# Patient Record
Sex: FEMALE | Race: OTHER | HISPANIC OR LATINO | URBAN - METROPOLITAN AREA
[De-identification: names, ages, dates, MRNs, and addresses within clinical notes are randomized per-mention and may not be internally consistent; named-entity substitution may affect disease eponyms.]

---

## 2018-07-20 ENCOUNTER — EMERGENCY (EMERGENCY)
Facility: HOSPITAL | Age: 43
LOS: 1 days | Discharge: HOME | End: 2018-07-20
Attending: EMERGENCY MEDICINE | Admitting: EMERGENCY MEDICINE
Payer: SELF-PAY

## 2018-07-20 VITALS
RESPIRATION RATE: 16 BRPM | DIASTOLIC BLOOD PRESSURE: 74 MMHG | OXYGEN SATURATION: 98 % | SYSTOLIC BLOOD PRESSURE: 120 MMHG | HEART RATE: 63 BPM | TEMPERATURE: 99 F

## 2018-07-20 LAB
ALBUMIN SERPL ELPH-MCNC: 4 G/DL — SIGNIFICANT CHANGE UP (ref 3.3–5)
ALP SERPL-CCNC: 67 U/L — SIGNIFICANT CHANGE UP (ref 40–120)
ALT FLD-CCNC: 24 U/L — SIGNIFICANT CHANGE UP (ref 4–33)
APTT BLD: 27.8 SEC — SIGNIFICANT CHANGE UP (ref 27.5–37.4)
AST SERPL-CCNC: 24 U/L — SIGNIFICANT CHANGE UP (ref 4–32)
BASOPHILS # BLD AUTO: 0.07 K/UL — SIGNIFICANT CHANGE UP (ref 0–0.2)
BASOPHILS NFR BLD AUTO: 0.9 % — SIGNIFICANT CHANGE UP (ref 0–2)
BILIRUB SERPL-MCNC: < 0.2 MG/DL — LOW (ref 0.2–1.2)
BUN SERPL-MCNC: 13 MG/DL — SIGNIFICANT CHANGE UP (ref 7–23)
CALCIUM SERPL-MCNC: 8.7 MG/DL — SIGNIFICANT CHANGE UP (ref 8.4–10.5)
CHLORIDE SERPL-SCNC: 102 MMOL/L — SIGNIFICANT CHANGE UP (ref 98–107)
CO2 SERPL-SCNC: 26 MMOL/L — SIGNIFICANT CHANGE UP (ref 22–31)
CREAT SERPL-MCNC: 0.58 MG/DL — SIGNIFICANT CHANGE UP (ref 0.5–1.3)
EOSINOPHIL # BLD AUTO: 0.25 K/UL — SIGNIFICANT CHANGE UP (ref 0–0.5)
EOSINOPHIL NFR BLD AUTO: 3.2 % — SIGNIFICANT CHANGE UP (ref 0–6)
GLUCOSE SERPL-MCNC: 138 MG/DL — HIGH (ref 70–99)
HCG SERPL-ACNC: < 5 MIU/ML — SIGNIFICANT CHANGE UP
HCT VFR BLD CALC: 28.3 % — LOW (ref 34.5–45)
HGB BLD-MCNC: 8.5 G/DL — LOW (ref 11.5–15.5)
IMM GRANULOCYTES # BLD AUTO: 0.02 # — SIGNIFICANT CHANGE UP
IMM GRANULOCYTES NFR BLD AUTO: 0.3 % — SIGNIFICANT CHANGE UP (ref 0–1.5)
INR BLD: 0.99 — SIGNIFICANT CHANGE UP (ref 0.88–1.17)
LYMPHOCYTES # BLD AUTO: 3.04 K/UL — SIGNIFICANT CHANGE UP (ref 1–3.3)
LYMPHOCYTES # BLD AUTO: 38.5 % — SIGNIFICANT CHANGE UP (ref 13–44)
MCHC RBC-ENTMCNC: 21.5 PG — LOW (ref 27–34)
MCHC RBC-ENTMCNC: 30 % — LOW (ref 32–36)
MCV RBC AUTO: 71.6 FL — LOW (ref 80–100)
MONOCYTES # BLD AUTO: 0.68 K/UL — SIGNIFICANT CHANGE UP (ref 0–0.9)
MONOCYTES NFR BLD AUTO: 8.6 % — SIGNIFICANT CHANGE UP (ref 2–14)
NEUTROPHILS # BLD AUTO: 3.84 K/UL — SIGNIFICANT CHANGE UP (ref 1.8–7.4)
NEUTROPHILS NFR BLD AUTO: 48.5 % — SIGNIFICANT CHANGE UP (ref 43–77)
NRBC # FLD: 0.02 — SIGNIFICANT CHANGE UP
PLATELET # BLD AUTO: 371 K/UL — SIGNIFICANT CHANGE UP (ref 150–400)
PMV BLD: 10.7 FL — SIGNIFICANT CHANGE UP (ref 7–13)
POTASSIUM SERPL-MCNC: 3.9 MMOL/L — SIGNIFICANT CHANGE UP (ref 3.5–5.3)
POTASSIUM SERPL-SCNC: 3.9 MMOL/L — SIGNIFICANT CHANGE UP (ref 3.5–5.3)
PROT SERPL-MCNC: 7 G/DL — SIGNIFICANT CHANGE UP (ref 6–8.3)
PROTHROM AB SERPL-ACNC: 11 SEC — SIGNIFICANT CHANGE UP (ref 9.8–13.1)
RBC # BLD: 3.95 M/UL — SIGNIFICANT CHANGE UP (ref 3.8–5.2)
RBC # FLD: 15.9 % — HIGH (ref 10.3–14.5)
REVIEW TO FOLLOW: YES — SIGNIFICANT CHANGE UP
SODIUM SERPL-SCNC: 139 MMOL/L — SIGNIFICANT CHANGE UP (ref 135–145)
WBC # BLD: 7.9 K/UL — SIGNIFICANT CHANGE UP (ref 3.8–10.5)
WBC # FLD AUTO: 7.9 K/UL — SIGNIFICANT CHANGE UP (ref 3.8–10.5)

## 2018-07-20 PROCEDURE — 99285 EMERGENCY DEPT VISIT HI MDM: CPT

## 2018-07-20 PROCEDURE — 72100 X-RAY EXAM L-S SPINE 2/3 VWS: CPT | Mod: 26

## 2018-07-20 PROCEDURE — 72220 X-RAY EXAM SACRUM TAILBONE: CPT | Mod: 26

## 2018-07-20 RX ORDER — MORPHINE SULFATE 50 MG/1
2 CAPSULE, EXTENDED RELEASE ORAL ONCE
Qty: 0 | Refills: 0 | Status: DISCONTINUED | OUTPATIENT
Start: 2018-07-20 | End: 2018-07-20

## 2018-07-20 RX ADMIN — MORPHINE SULFATE 2 MILLIGRAM(S): 50 CAPSULE, EXTENDED RELEASE ORAL at 22:43

## 2018-07-20 NOTE — ED PROVIDER NOTE - PROGRESS NOTE DETAILS
pt no distress. pending ct to be read. Endorse to Dr Jackson Dr. Jackson: pt signed out to me from Dr. Hanna with plan to follow up imaging; imaging showing no acute traumatic injury of C/A/P, coccyx fracture noted; I re-evaluated pt and she was in NAD, aware of imaging findings, gave instructions for OTC naproxen use and will discharge with outpatient follow up; pt aware of plan and agrees; gave copies of imaging results

## 2018-07-20 NOTE — ED PROVIDER NOTE - PLAN OF CARE
1. [facility]  CHRISTINA [phone] [facility].

## 2018-07-20 NOTE — ED PROVIDER NOTE - PHYSICAL EXAMINATION
pt sitting up, Aox3. no sign of facial trauma. no midline cervical tenderness, tender along the left para cervical spine. no midline thoracic  spine. tender lumbar spine. normal S1-S2 No resp distress. able to speak in full and clear sentences. no wheeze, rales or stridor. no crepitus. tender lower left chest/abdomen. soft nondistended tender left side. no guarding. no ecchymosis. no cvat. stable nontender pelvis. pt states she "walked in" nl flexion/extension bl hips/knees/ankles. nl . AOx3, no focal deficits. CN 2-12 grossly intact. nl gait. no meningeal signs. no lac. no abrasions.

## 2018-07-20 NOTE — ED ADULT TRIAGE NOTE - CHIEF COMPLAINT QUOTE
pt arrives s/p fall last Tuesday c/o pain to back of head and neck. pt states [place]. pt not sure how long she was out just woke up to EMS around her. pt states refused to go to hospital due to being scared. pt states took Aleve for the pain @ 11am. pt denies any nausea or vomiting.

## 2018-07-20 NOTE — ED PROVIDER NOTE - CARE PLAN
Principal Discharge DX:	Fracture of coccyx  Assessment and plan of treatment:	1. [facility]  CHRISTINA [phone] [facility].

## 2018-07-20 NOTE — ED PROVIDER NOTE - OBJECTIVE STATEMENT
44yo F no sig pmhx sp mechanical fall 3 days ago. pt states she "s[place] and [place]" hit back of her head, +LOC, "woke up with EMS around me" Refused to go to hosp. Endorsing pain back of head/neck and lower back. No nausea/vomit. no numbness/weakness. no change in vision. no chest pain no sob no palpitations. no abdominal pain. not on AC. took alleve w min relief. Denies preg.

## 2018-07-21 VITALS
RESPIRATION RATE: 16 BRPM | SYSTOLIC BLOOD PRESSURE: 111 MMHG | DIASTOLIC BLOOD PRESSURE: 60 MMHG | HEART RATE: 73 BPM | TEMPERATURE: 99 F | OXYGEN SATURATION: 99 %

## 2018-07-21 PROCEDURE — 70450 CT HEAD/BRAIN W/O DYE: CPT | Mod: 26

## 2018-07-21 PROCEDURE — 74175 CTA ABDOMEN W/CONTRAST: CPT | Mod: 26

## 2018-07-21 PROCEDURE — 71275 CT ANGIOGRAPHY CHEST: CPT | Mod: 26

## 2018-07-21 PROCEDURE — 72125 CT NECK SPINE W/O DYE: CPT | Mod: 26

## 2018-07-21 PROCEDURE — 72193 CT PELVIS W/DYE: CPT | Mod: 26

## 2018-07-22 NOTE — ED POST DISCHARGE NOTE - RESULT SUMMARY
Peer Pham : Distal cocyx segment is slightly subluxed anteriorly age indeterminate.  According to ED provider noter patient told fracture of cocyx. Patient contact #  [phone] spoke with patient and  explained Distal cocyx is slightly subluxed anteriorly. Patient to follow up with MD. Discussed with patient need to return to ED if symptoms don't continue to improve or recur or develops any new or worsening symptoms that are of concern.

## 2021-04-30 NOTE — ED PROVIDER NOTE - NS_EDPROVIDERDISPOUSERTYPE_ED_A_ED
Transition Plan of Care  RUR 31%-High  Disposition planned discharge for today. This CM confirmed transport time of 1000 with dispatcher Eric Mascorro [phone] at [place] [id number]. They will bring a wheelchair to patient's room. Also discussed with Eric Mascorro the failure to transport patient yesterday. See previous CM note from 4/29/21. I stated I personally scheduled yesterday's transport time with [facility] and he confirmed that he would be here between [phone]. Suggested a different way to confirm pickup times, like a fax confirmation. He apologized and assure this CM that today's transport will not be delayed. Tr Chaves RN CRM  Ext [phone]    8:13am: CM confirmed again with Eric Mascorro at [facility] that patient will be picked up at 10am this morning. CM informed Carly MIGUEL July and asked to be notified if patient was not picked up by Twyla Leblanc ([address] [place] [address] 22) MARCI LewisS.TYLER Supervisee    Update ELIZABETH reached out to volunteer office Paulette Rodriguez to update that patient is being picked up now. Confirmed patient's cell number was working and relayed that information to her to schedule a time for dropping off the groceries at patient's home. Medicare pt has received, reviewed, and signed 2nd IM letter informing them of their right to appeal the discharge. Signed copy has been placed on pt bedside chart. Attending Attestation (For Attendings USE Only)...